# Patient Record
(demographics unavailable — no encounter records)

---

## 2024-10-08 NOTE — HISTORY OF PRESENT ILLNESS
[de-identified] : 10/8/24: The patient returns to the office today for repeat evaluation of her right trigger thumb.  She received a CSI at her last visit and notes complete resolution of her previous pain and clicking.  She has no complaints today.  She is very happy with this outcome.     09/24/2024 JEROME GIFFORD is a 57 year-old female here today for: Location: right thumb  Complaint: Patient presents office today for evaluation of right thumb clicking and triggering.  No injuries that she can recall.  No numbness or tingling.  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

## 2024-10-08 NOTE — DISCUSSION/SUMMARY
[de-identified] : - discussed the etiology/ pathophysiology of the patient's complaints today in layman's terms - reviewed treatment options, conservative and operative - discussed conservative treatment options including the role for anti-inflammatory medications, injections, bracing and therapy - reviewed the operative procedure including trigger thumb release and the postoperative expectations - reviewed r/b/a to these - activities as tolerated - NSAIDs prn pain - f/u as needed

## 2024-10-08 NOTE — IMAGING
[de-identified] : Right hand No ecchymosis, swelling or wounds No triggering is observed in the thumb NTTP at the A1 pulley of the thumb  Able to make a full composite fist +AIN/ PIN/ Ulnar n SILT throughout fingers wwp  3 views right thumb: No acute fractures or malalignment, no cortical irregularities

## 2025-07-14 NOTE — DISCUSSION/SUMMARY
[de-identified] : Discussed sling use NWB hydrocodone- acetaminophen 5-325 mg - limited supply, no refills Follow up in 2 weeks, repeat xrays  ----------------------------------------------------------------------------  A temporary and limited supply of narcotic pain medication was provided to the patient. Patient was advised to wean usage. Patient was advised of potential side effects, dependency and patient was advised refills may not be provided.   ----------------------------------------------------------------------------   All relevant imaging studies pertinent to today's visit, including x-rays, MRI's and/or other advanced imaging studies (CT/etc) were independently interpreted and reviewed with the patient as needed. Implications of the studies together with the patient's clinical picture were discussed to formulate a working diagnosis and management options were detailed.   The patient and/or guardian was advised of the diagnosis.  The natural history of the pathology was explained in full. All questions were answered.  The risks and benefits of conservative and interventional treatment alternatives were explained to the patient   The patient and/or guardian was advised if any advanced diagnostic/imaging study (MRI/CT/etc) is ordered to evaluate potential pathology in the affected area(s), they should follow up in the office to review the results of the study and determine further management that may be indicated.

## 2025-07-14 NOTE — HISTORY OF PRESENT ILLNESS
[Sudden] : sudden [9] : 9 [Sharp] : sharp [Frequent] : frequent [Meds] : meds [de-identified] : This is Ms. JEROME GIFFORD  a 58 year old female who comes in today complaining of right clavicle pain since falling while crossing the street on 7/12/25.   [] : no [FreeTextEntry3] : 7/12/25 [de-identified] : lijvs er [de-identified] : xray/ct

## 2025-07-14 NOTE — IMAGING
[Right] : right shoulder [de-identified] :  ----------------------------------------------------------------------------   Right shoulder exam:   Skin: no significant pertinent finding Inspection: no obvious deformity, no obvious masses, no swelling, no effusion, no atrophy ROM: limited by pain Tenderness: tenderness over clavicle Strength: Distal: 5/5 Neuro: In tact to light touch throughout Vascularity: Extremity warm and well perfused   [FreeTextEntry3] : distal third midshaft junction transverse comminuted clavicle fracture. Mild displacement

## 2025-07-29 NOTE — DISCUSSION/SUMMARY
[Surgical risks reviewed] : Surgical risks reviewed [de-identified] : Discussed Right clavicle ORIF given increased displacement and skin tinting and pt's activity level Discussed r/b/a rehab recovery Discussed specific risk of possible future hardware removal Discussed natural history with and w/o surgery Pt wished to proceed with surgical management    ----------------------------------------------------------------------------   The patient was advised of the diagnosis.  The natural history of the pathology was explained in full to the patient. All questions were answered.  The risks and benefits of surgical and non-surgical treatment were explained in full to the patient.  The patient demonstrated a full understanding of the surgical and non-surgical options.  The risks of surgery were outlined in full to the patient including but not limited to pain, stiffness, bleeding, scarring, infection, neurologic injury, vascular injury, failure to resolve symptoms, symptom recurrence, the need for further surgery, non-healing, implant failure, intraoperative fracture, wound breakdown, deep vein thrombosis, pulmonary embolism, anesthesia complications and even death.  The patient understood all the risks and accepted them and understood that other complications could occur that are not mentioned above.  The intraoperative plan, post-operative plan, post-operative expectations and limitations were explained in full.  Importance of postoperative rehabilitation and restriction compliance was explained and emphasized. Expectations from non-surgical treatment were explained in full as well.  The patient demonstrated a complete understanding of the treatment detailed, the risks and alternatives.   Brace will be needed for patient pre and postoperatively for stabilization procedures and Rx will be provided as indicated      ----------------------------------------------------------------------------   All relevant imaging studies pertinent to today's visit, including x-rays, MRI's and/or other advanced imaging studies (CT/etc) were independently interpreted and reviewed with the patient as needed. Implications of the studies together with the patient's clinical picture were discussed to formulate a working diagnosis and management options were detailed.   The patient and/or guardian was advised of the diagnosis.  The natural history of the pathology was explained in full. All questions were answered.  The risks and benefits of conservative and interventional treatment alternatives were explained to the patient   The patient and/or guardian was advised if any advanced diagnostic/imaging study (MRI/CT/etc) is ordered to evaluate potential pathology in the affected area(s), they should follow up in the office to review the results of the study and determine further management that may be indicated.

## 2025-07-29 NOTE — IMAGING
[Right] : right shoulder [Outside films reviewed] : Outside films reviewed [de-identified] : ----------------------------------------------------------------------------   Right shoulder exam:   Skin: no significant pertinent finding Inspection: no obvious deformity, no obvious masses, no swelling, no effusion, no atrophy ROM: limited by pain Tenderness: tenderness over clavicle, bony spike at distal clavicle with skin tinting Strength: Distal: 5/5 Neuro: In tact to light touch throughout Vascularity: Extremity warm and well perfused   [FreeTextEntry3] : distal third midshaft junction transverse comminuted clavicle fracture. Mild displacement. increased shortening since previous imaging with bayonette apposition and displacement shortening 15.5 mm

## 2025-07-29 NOTE — HISTORY OF PRESENT ILLNESS
[Sudden] : sudden [4] : 4 [Sharp] : sharp [Frequent] : frequent [Meds] : meds [de-identified] : This is Ms. JEROME GIFFORD  a 58 year old female who comes in today complaining of right clavicle pain since falling while crossing the street on 7/12/25.   [] : no [FreeTextEntry3] : 7/12/25 [de-identified] : lijvs er [de-identified] : xray/ct